# Patient Record
Sex: MALE | Race: WHITE | Employment: PART TIME | URBAN - METROPOLITAN AREA
[De-identification: names, ages, dates, MRNs, and addresses within clinical notes are randomized per-mention and may not be internally consistent; named-entity substitution may affect disease eponyms.]

---

## 2024-05-06 ENCOUNTER — OFFICE VISIT (OUTPATIENT)
Dept: FAMILY MEDICINE CLINIC | Age: 69
End: 2024-05-06

## 2024-05-06 ENCOUNTER — TELEPHONE (OUTPATIENT)
Dept: FAMILY MEDICINE CLINIC | Age: 69
End: 2024-05-06

## 2024-05-06 VITALS
BODY MASS INDEX: 32.77 KG/M2 | SYSTOLIC BLOOD PRESSURE: 126 MMHG | WEIGHT: 228.4 LBS | DIASTOLIC BLOOD PRESSURE: 84 MMHG | RESPIRATION RATE: 18 BRPM | HEART RATE: 72 BPM

## 2024-05-06 DIAGNOSIS — Z86.718 HISTORY OF DEEP VENOUS THROMBOSIS (DVT) OF DISTAL VEIN OF RIGHT LOWER EXTREMITY: ICD-10-CM

## 2024-05-06 DIAGNOSIS — I82.432 ACUTE DEEP VEIN THROMBOSIS (DVT) OF POPLITEAL VEIN OF LEFT LOWER EXTREMITY (HCC): Primary | ICD-10-CM

## 2024-05-06 ASSESSMENT — PATIENT HEALTH QUESTIONNAIRE - PHQ9
SUM OF ALL RESPONSES TO PHQ9 QUESTIONS 1 & 2: 0
2. FEELING DOWN, DEPRESSED OR HOPELESS: NOT AT ALL
SUM OF ALL RESPONSES TO PHQ QUESTIONS 1-9: 0
1. LITTLE INTEREST OR PLEASURE IN DOING THINGS: NOT AT ALL

## 2024-05-06 ASSESSMENT — ENCOUNTER SYMPTOMS
COUGH: 0
NAUSEA: 0
SHORTNESS OF BREATH: 0
ABDOMINAL PAIN: 0

## 2024-05-06 NOTE — TELEPHONE ENCOUNTER
Received a call from Dr. Carrizales office and he is unable to see the patient due to the diagnosis he doesn't see DVT.     She stated that if we would like patient to see someone through [facility] Dr. Ray Weber is the provider we will need to make the referral to.     Ray Weber MD  General Surgery, Critical Care Surgery, Vein Care, Trauma

## 2024-05-06 NOTE — TELEPHONE ENCOUNTER
Tyrone with Dr Jonas's office calling due to chart and referral name to have patient's given 1st name listed or they are unable to accept referral.  Will discuss with clerical staff and referral will need to be replaced.  Per 's license in media, patients first name is Charles

## 2024-05-06 NOTE — PROGRESS NOTES
Referral faxed to Dr. Jonas at [phone].  
  Gastrointestinal:  Negative for abdominal pain and nausea.   Skin:  Negative for rash.   Neurological:  Negative for dizziness, light-headedness and headaches.   Psychiatric/Behavioral: Negative.         Objective:   Physical Exam  Constitutional:       General: He is not in acute distress.  Eyes:      Pupils: Pupils are equal, round, and reactive to light.   Cardiovascular:      Rate and Rhythm: Normal rate and regular rhythm.      Heart sounds: No murmur heard.  Pulmonary:      Effort: Pulmonary effort is normal.      Breath sounds: Normal breath sounds. No wheezing.   Abdominal:      General: Bowel sounds are normal. There is no distension.      Palpations: Abdomen is soft.      Tenderness: There is no abdominal tenderness.   Musculoskeletal:         General: No tenderness. Normal range of motion.      Cervical back: Normal range of motion and neck supple.   Skin:     General: Skin is warm and dry.      Findings: No rash.          Assessment:       Diagnosis Orders   1. Acute deep vein thrombosis (DVT) of popliteal vein of left lower extremity (HCC)  apixaban (ELIQUIS) 5 MG TABS tablet    Amb External Referral To Cardiovascular Surgery      2. History of deep venous thrombosis (DVT) of distal vein of right lower extremity  apixaban (ELIQUIS) 5 MG TABS tablet    Amb External Referral To Cardiovascular Surgery                Plan:      ED report reviewed  Eliquis 5 mg BID indefinite  Unprovoked DVT x 2  Refer to [facility] for May-Thurner Evaluation  RTO PRN      Current Outpatient Medications   Medication Sig Dispense Refill    apixaban (ELIQUIS) 5 MG TABS tablet Take 1 tablet by mouth 2 times daily 180 tablet 3    lisinopril-hydroCHLOROthiazide (PRINZIDE;ZESTORETIC) 20-25 MG per tablet Take 1 tablet by mouth daily 90 tablet 3     No current facility-administered medications for this visit.

## 2024-05-10 ENCOUNTER — TELEPHONE (OUTPATIENT)
Dept: FAMILY MEDICINE CLINIC | Age: 69
End: 2024-05-10

## 2024-05-10 DIAGNOSIS — I82.432 ACUTE DEEP VEIN THROMBOSIS (DVT) OF POPLITEAL VEIN OF LEFT LOWER EXTREMITY (HCC): Primary | ICD-10-CM

## 2024-05-10 DIAGNOSIS — Z86.718 HISTORY OF DEEP VENOUS THROMBOSIS (DVT) OF DISTAL VEIN OF RIGHT LOWER EXTREMITY: ICD-10-CM

## 2024-05-10 NOTE — TELEPHONE ENCOUNTER
Keri with Dr Ramirez office calling regarding referral. They due not see patient's to manage DVT or evaluation for May-Thurner Syndrome.  They will see patient if he has vein insufficieny or edema.  They are recommending provider order a CT Abdomen vengagram with run off for evaluation of May-Thurner Syndrome.  Please advise

## 2024-06-25 ENCOUNTER — TELEPHONE (OUTPATIENT)
Dept: FAMILY MEDICINE CLINIC | Age: 69
End: 2024-06-25

## 2024-06-25 NOTE — TELEPHONE ENCOUNTER
Evelin with [facility] CT at EXT: [id number] calling to verify orders for CTA venous abdomen pelvis with and without contrast with run-off to evaluate for may-thurner syndrome.  Evelin wants to make sure provider is aware testing will stop at the pelvis and not go to the toes.  If he is wanting it to go to the toes, a CT Abdomen and pelvis with run-off (Arterial) would be ordered but this test is not usually ordered to check for may-thurner syndrome.  She just wanted to make provider aware CTA venous abdomen pelvis with and without contrast with run-off will stop at the pelvis.  Please advise and call Evelin back.  Patient is scheduled for Thursday

## 2024-06-27 ENCOUNTER — HOSPITAL ENCOUNTER (OUTPATIENT)
Dept: CT IMAGING | Age: 69
Discharge: HOME | End: 2024-06-27
Payer: MEDICARE

## 2024-06-27 DIAGNOSIS — Z86.718 HISTORY OF DEEP VENOUS THROMBOSIS (DVT) OF DISTAL VEIN OF RIGHT LOWER EXTREMITY: ICD-10-CM

## 2024-06-27 DIAGNOSIS — I82.432 ACUTE DEEP VEIN THROMBOSIS (DVT) OF POPLITEAL VEIN OF LEFT LOWER EXTREMITY (HCC): ICD-10-CM

## 2024-06-27 DIAGNOSIS — N40.1 BENIGN PROSTATIC HYPERPLASIA WITH URINARY FREQUENCY: ICD-10-CM

## 2024-06-27 DIAGNOSIS — N32.89 BLADDER WALL THICKENING: ICD-10-CM

## 2024-06-27 DIAGNOSIS — R35.0 BENIGN PROSTATIC HYPERPLASIA WITH URINARY FREQUENCY: ICD-10-CM

## 2024-06-27 DIAGNOSIS — N20.0 KIDNEY STONE ON LEFT SIDE: Primary | ICD-10-CM

## 2024-06-27 LAB — POC CREATININE WHOLE BLOOD: 1.2 MG/DL (ref 0.5–1.2)

## 2024-06-27 PROCEDURE — 6360000004 HC RX CONTRAST MEDICATION: Performed by: NURSE PRACTITIONER

## 2024-06-27 PROCEDURE — 82565 ASSAY OF CREATININE: CPT

## 2024-06-27 PROCEDURE — 74174 CTA ABD&PLVS W/CONTRAST: CPT

## 2024-06-27 RX ORDER — CIPROFLOXACIN 500 MG/1
500 TABLET, FILM COATED ORAL 2 TIMES DAILY
Qty: 14 TABLET | Refills: 0 | Status: SHIPPED | OUTPATIENT
Start: 2024-06-27 | End: 2024-07-04

## 2024-06-27 RX ADMIN — IOPAMIDOL 125 ML: 755 INJECTION, SOLUTION INTRAVENOUS at 08:55

## 2025-01-13 DIAGNOSIS — I10 ESSENTIAL HYPERTENSION: ICD-10-CM

## 2025-01-13 RX ORDER — LISINOPRIL AND HYDROCHLOROTHIAZIDE 20; 25 MG/1; MG/1
1 TABLET ORAL DAILY
Qty: 90 TABLET | Refills: 3 | Status: SHIPPED | OUTPATIENT
Start: 2025-01-13

## 2025-03-11 SDOH — ECONOMIC STABILITY: FOOD INSECURITY: WITHIN THE PAST 12 MONTHS, THE FOOD YOU BOUGHT JUST DIDN'T LAST AND YOU DIDN'T HAVE MONEY TO GET MORE.: NEVER TRUE

## 2025-03-11 SDOH — ECONOMIC STABILITY: TRANSPORTATION INSECURITY
IN THE PAST 12 MONTHS, HAS LACK OF TRANSPORTATION KEPT YOU FROM MEETINGS, WORK, OR FROM GETTING THINGS NEEDED FOR DAILY LIVING?: NO

## 2025-03-11 SDOH — HEALTH STABILITY: PHYSICAL HEALTH: ON AVERAGE, HOW MANY MINUTES DO YOU ENGAGE IN EXERCISE AT THIS LEVEL?: 60 MIN

## 2025-03-11 SDOH — ECONOMIC STABILITY: TRANSPORTATION INSECURITY
IN THE PAST 12 MONTHS, HAS THE LACK OF TRANSPORTATION KEPT YOU FROM MEDICAL APPOINTMENTS OR FROM GETTING MEDICATIONS?: NO

## 2025-03-11 SDOH — ECONOMIC STABILITY: FOOD INSECURITY: WITHIN THE PAST 12 MONTHS, YOU WORRIED THAT YOUR FOOD WOULD RUN OUT BEFORE YOU GOT MONEY TO BUY MORE.: NEVER TRUE

## 2025-03-11 SDOH — ECONOMIC STABILITY: INCOME INSECURITY: IN THE LAST 12 MONTHS, WAS THERE A TIME WHEN YOU WERE NOT ABLE TO PAY THE MORTGAGE OR RENT ON TIME?: NO

## 2025-03-11 SDOH — HEALTH STABILITY: PHYSICAL HEALTH: ON AVERAGE, HOW MANY DAYS PER WEEK DO YOU ENGAGE IN MODERATE TO STRENUOUS EXERCISE (LIKE A BRISK WALK)?: 4 DAYS

## 2025-03-11 ASSESSMENT — PATIENT HEALTH QUESTIONNAIRE - PHQ9
SUM OF ALL RESPONSES TO PHQ QUESTIONS 1-9: 0
2. FEELING DOWN, DEPRESSED OR HOPELESS: NOT AT ALL
1. LITTLE INTEREST OR PLEASURE IN DOING THINGS: NOT AT ALL

## 2025-03-11 ASSESSMENT — LIFESTYLE VARIABLES
HOW OFTEN DO YOU HAVE SIX OR MORE DRINKS ON ONE OCCASION: 1
HOW OFTEN DO YOU HAVE A DRINK CONTAINING ALCOHOL: 1
HOW MANY STANDARD DRINKS CONTAINING ALCOHOL DO YOU HAVE ON A TYPICAL DAY: PATIENT DOES NOT DRINK
HOW OFTEN DO YOU HAVE A DRINK CONTAINING ALCOHOL: NEVER
HOW MANY STANDARD DRINKS CONTAINING ALCOHOL DO YOU HAVE ON A TYPICAL DAY: 0

## 2025-03-12 ENCOUNTER — OFFICE VISIT (OUTPATIENT)
Dept: FAMILY MEDICINE CLINIC | Age: 70
End: 2025-03-12

## 2025-03-12 ENCOUNTER — TELEPHONE (OUTPATIENT)
Dept: FAMILY MEDICINE CLINIC | Age: 70
End: 2025-03-12

## 2025-03-12 VITALS
BODY MASS INDEX: 33.24 KG/M2 | WEIGHT: 232.2 LBS | HEART RATE: 68 BPM | DIASTOLIC BLOOD PRESSURE: 80 MMHG | SYSTOLIC BLOOD PRESSURE: 124 MMHG | HEIGHT: 70 IN | RESPIRATION RATE: 16 BRPM

## 2025-03-12 DIAGNOSIS — R73.01 IFG (IMPAIRED FASTING GLUCOSE): ICD-10-CM

## 2025-03-12 DIAGNOSIS — Z86.718 HISTORY OF DEEP VENOUS THROMBOSIS (DVT) OF DISTAL VEIN OF RIGHT LOWER EXTREMITY: ICD-10-CM

## 2025-03-12 DIAGNOSIS — I82.409 RECURRENT DEEP VEIN THROMBOSIS (DVT) (HCC): ICD-10-CM

## 2025-03-12 DIAGNOSIS — E55.9 VITAMIN D DEFICIENCY: ICD-10-CM

## 2025-03-12 DIAGNOSIS — R97.20 ABNORMAL PSA: ICD-10-CM

## 2025-03-12 DIAGNOSIS — E78.5 HYPERLIPIDEMIA, UNSPECIFIED HYPERLIPIDEMIA TYPE: ICD-10-CM

## 2025-03-12 DIAGNOSIS — Z00.00 MEDICARE ANNUAL WELLNESS VISIT, SUBSEQUENT: Primary | ICD-10-CM

## 2025-03-12 DIAGNOSIS — I82.432 ACUTE DEEP VEIN THROMBOSIS (DVT) OF POPLITEAL VEIN OF LEFT LOWER EXTREMITY (HCC): ICD-10-CM

## 2025-03-12 LAB
A/G RATIO: 1.3 (ref 1.5–2.5)
ALBUMIN: 4.1 G/DL (ref 3.5–5)
ALP BLD-CCNC: 97 IU/L (ref 41–137)
ALT SERPL-CCNC: 33 IU/L (ref 10–40)
ANION GAP SERPL CALCULATED.3IONS-SCNC: 7 MMOL/L (ref 4–12)
AST SERPL-CCNC: 27 IU/L (ref 15–41)
BASOPHILS ABSOLUTE: 0 /CMM (ref 0–200)
BASOPHILS RELATIVE PERCENT: 0.4 % (ref 0–2)
BILIRUB SERPL-MCNC: 0.5 MG/DL (ref 0.2–1)
BUN BLDV-MCNC: 25 MG/DL (ref 7–20)
CALCIUM SERPL-MCNC: 9.1 MG/DL (ref 8.8–10.5)
CHLORIDE BLD-SCNC: 107 MEQ/L (ref 101–111)
CHOLESTEROL, TOTAL: 160 MG/DL
CHOLESTEROL/HDL RELATIVE RISK: 4.5 (ref 4–5)
CO2: 24 MEQ/L (ref 21–32)
CREAT SERPL-MCNC: 1.14 MG/DL (ref 0.6–1.3)
CREATININE CLEARANCE: >60
DIRECT-LDL / HDL RISK: 3.3
EOSINOPHILS ABSOLUTE: 200 /CMM (ref 0–500)
EOSINOPHILS RELATIVE PERCENT: 3.9 % (ref 0–6)
ESTIMATED AVERAGE GLUCOSE: 126 MG/DL
GLUCOSE: 105 MG/DL (ref 70–110)
HBA1C MFR BLD: 6 % (ref 4.4–6.4)
HCT VFR BLD CALC: 45.5 % (ref 40–49)
HDLC SERPL-MCNC: 35 MG/DL
HEMOGLOBIN: 15.5 GM/DL (ref 13.5–16.5)
LDL CHOLESTEROL DIRECT: 117 MG/DL
LYMPHOCYTES ABSOLUTE: 1300 /CMM (ref 1000–4800)
LYMPHOCYTES RELATIVE PERCENT: 22.2 % (ref 15–45)
MCH RBC QN AUTO: 30.6 PG (ref 27.5–33)
MCHC RBC AUTO-ENTMCNC: 34.1 GM/DL (ref 33–36)
MCV RBC AUTO: 89.8 CU MIC (ref 80–97)
MONOCYTES ABSOLUTE: 600 /CMM (ref 0–800)
MONOCYTES RELATIVE PERCENT: 10.1 % (ref 2–10)
NEUTROPHILS ABSOLUTE: 3700 /CMM (ref 1800–7700)
NEUTROPHILS RELATIVE PERCENT: 63.4 % (ref 40–70)
NUCLEATED RBCS: 0.1 /100 WBC
PDW BLD-RTO: 13.2 % (ref 12–16)
PLATELET # BLD: 276 TH/CMM (ref 150–400)
POTASSIUM SERPL-SCNC: 4.4 MEQ/L (ref 3.6–5)
PROSTATE SPECIFIC ANTIGEN: 4.47 NG/ML
RBC # BLD: 5.06 MIL/CMM (ref 4.5–6)
SODIUM BLD-SCNC: 138 MEQ/L (ref 135–145)
TOTAL PROTEIN: 7.3 G/DL (ref 6.2–8)
TRIGL SERPL-MCNC: 126 MG/DL
TSH REFLEX: 0.78 MCIU/ML (ref 0.49–4.67)
VITAMIN D 25-HYDROXY: 21.4 NG/ML (ref 30–100)
VLDLC SERPL CALC-MCNC: 25 MG/DL
WBC # BLD: 5.8 TH/CMM (ref 4.4–10.5)

## 2025-03-12 RX ORDER — RIVAROXABAN 10 MG/1
10 TABLET, FILM COATED ORAL
Qty: 90 TABLET | Refills: 3 | Status: SHIPPED | OUTPATIENT
Start: 2025-03-12 | End: 2025-03-12

## 2025-03-12 ASSESSMENT — ENCOUNTER SYMPTOMS
NAUSEA: 0
SHORTNESS OF BREATH: 0
ABDOMINAL PAIN: 0
COUGH: 0

## 2025-03-12 NOTE — TELEPHONE ENCOUNTER
Pt called office stating he just had an appt with you earlier today. He was to check the price on Xarelto vs Eliquis. Pt spoke with [facility] and the Eliquis is about $100 cheaper. So he would like to just continue with Eliquis, he's never had a problem with it. Pt just picked up his 3mo supply of the Eliquis 2wks ago but is asking that you send a new rx for a year for when he needs to get the refill.

## 2025-03-12 NOTE — PROGRESS NOTES
Subjective:      Patient ID: Charles Robison 1955 is a 69 y.o. male here for evaluation.     Chief Complaint   Patient presents with    Medicare AWV    Medication Refill       Patient Active Problem List   Diagnosis    IFG (impaired fasting glucose)    Recurrent deep vein thrombosis (DVT) (HCC)       COLONOSCOPY - 2022    Denies CP, SOB or chest tightness.      CT Cardiac Score 2024:  IMPRESSION:    A Calcium Score of 228 places the patient in the approximate 53rd percentile, based on the PAIGE data calculator. Moderate coronary artery    plaque.       Hx of PE and DVT Right LLE in 2022.  COAG work up was NEG.  Was treated on Eliquis x 4 months and taken off.   DVT unprovoked at that time as well.   Repeat DVT in 2024 unprovoked.  NEG imaging for May-Thurner Syndrome.     CTA Venous A/P 6/2024;  IMPRESSION:  1. Questionable cystitis. Hepatic steatosis. Bilateral nonobstructing renal  calculi. Mildly enlarged prostate gland.  2. 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  3. Venous structures are widely patent. No evidence for May-Thurner syndrome.    Wt Readings from Last 3 Encounters:   03/12/25 105.3 kg (232 lb 3.2 oz)   05/06/24 103.6 kg (228 lb 6.4 oz)   12/19/23 101.3 kg (223 lb 4.8 oz)       On Prinzide 20-25 mg Daily.  Tolerating well.  BP wnl   Body mass index is 33.8 kg/m².     BP Readings from Last 3 Encounters:   03/12/25 124/80   05/06/24 126/84   12/19/23 116/64       Labs reviewed    Lab Results   Component Value Date    LABA1C 5.4 12/12/2023    LABA1C 5.7 12/07/2022    LABA1C 5.8 11/30/2021     Lab Results   Component Value Date     12/12/2023       No components found for: \"CHLPL\"  Lab Results   Component Value Date    TRIG 127 12/12/2023    TRIG 176 (H) 12/07/2022    TRIG 164 (H) 11/30/2021     Lab Results   Component Value Date    HDL 41 12/12/2023    HDL 36 (L) 12/07/2022    HDL 37 (L) 11/30/2021     No components found for:

## 2025-03-12 NOTE — PROGRESS NOTES
Medicare Annual Wellness Visit    Charles Robison is here for Medicare AWV and Medication Refill    Assessment & Plan   Medicare annual wellness visit, subsequent  Acute deep vein thrombosis (DVT) of popliteal vein of left lower extremity (HCC)  The following orders have not been finalized:  -     apixaban (ELIQUIS) 5 MG TABS tablet  History of deep venous thrombosis (DVT) of distal vein of right lower extremity  The following orders have not been finalized:  -     apixaban (ELIQUIS) 5 MG TABS tablet       No follow-ups on file.     Subjective       Patient's complete Health Risk Assessment and screening values have been reviewed and are found in Flowsheets. The following problems were reviewed today and where indicated follow up appointments were made and/or referrals ordered.    Positive Risk Factor Screenings with Interventions:                Abnormal BMI (obese):  Body mass index is 33.8 kg/m². (!) Abnormal  Interventions:  low carbohydrate diet          Vision Screen:  Do you have difficulty driving, watching TV, or doing any of your daily activities because of your eyesight?: (Patient-Rptd) No  Have you had an eye exam within the past year?: (!) (Patient-Rptd) No  Interventions:   Patient encouraged to make appointment with their eye specialist                    Objective   Vitals:    03/12/25 0935   BP: 124/80   BP Site: Left Upper Arm   Patient Position: Sitting   BP Cuff Size: Large Adult   Pulse: 68   Resp: 16   Weight: 105.3 kg (232 lb 3.2 oz)   Height: 1.765 m (5' 9.5\")      Body mass index is 33.8 kg/m².                  Allergies   Allergen Reactions    No Known Allergies      Prior to Visit Medications    Medication Sig Taking? Authorizing Provider   lisinopril-hydroCHLOROthiazide (PRINZIDE;ZESTORETIC) 20-25 MG per tablet TAKE 1 TABLET BY MOUTH EVERY DAY Yes Sergio Colbert APRN - CNP   apixaban (ELIQUIS) 5 MG TABS tablet Take 1 tablet by mouth 2 times daily Yes Sergio Colbert APRN - CNP

## 2025-03-13 ENCOUNTER — RESULTS FOLLOW-UP (OUTPATIENT)
Dept: FAMILY MEDICINE CLINIC | Age: 70
End: 2025-03-13